# Patient Record
Sex: FEMALE | Race: WHITE
[De-identification: names, ages, dates, MRNs, and addresses within clinical notes are randomized per-mention and may not be internally consistent; named-entity substitution may affect disease eponyms.]

---

## 2019-01-30 ENCOUNTER — HOSPITAL ENCOUNTER (EMERGENCY)
Dept: HOSPITAL 10 - FTE | Age: 24
Discharge: HOME | End: 2019-01-30
Payer: COMMERCIAL

## 2019-01-30 ENCOUNTER — HOSPITAL ENCOUNTER (EMERGENCY)
Dept: HOSPITAL 91 - FTE | Age: 24
Discharge: HOME | End: 2019-01-30
Payer: COMMERCIAL

## 2019-01-30 VITALS — HEART RATE: 60 BPM | DIASTOLIC BLOOD PRESSURE: 58 MMHG | SYSTOLIC BLOOD PRESSURE: 100 MMHG | RESPIRATION RATE: 20 BRPM

## 2019-01-30 VITALS
BODY MASS INDEX: 22.53 KG/M2 | WEIGHT: 152.12 LBS | HEIGHT: 69 IN | HEIGHT: 69 IN | BODY MASS INDEX: 22.53 KG/M2 | WEIGHT: 152.12 LBS

## 2019-01-30 DIAGNOSIS — F17.210: ICD-10-CM

## 2019-01-30 DIAGNOSIS — N12: Primary | ICD-10-CM

## 2019-01-30 LAB
ADD MAN DIFF?: NO
ADD UMIC: YES
ALANINE AMINOTRANSFERASE: 25 IU/L (ref 13–69)
ALBUMIN/GLOBULIN RATIO: 1.25
ALBUMIN: 4.4 G/DL (ref 3.3–4.9)
ALKALINE PHOSPHATASE: 77 IU/L (ref 42–121)
ANION GAP: 11 (ref 5–13)
ASPARTATE AMINO TRANSFERASE: 19 IU/L (ref 15–46)
BASOPHIL #: 0.1 10^3/UL (ref 0–0.1)
BASOPHILS %: 0.7 % (ref 0–2)
BILIRUBIN,DIRECT: 0 MG/DL (ref 0–0.2)
BILIRUBIN,TOTAL: 0.1 MG/DL (ref 0.2–1.3)
BLOOD UREA NITROGEN: 10 MG/DL (ref 7–20)
CALCIUM: 9.1 MG/DL (ref 8.4–10.2)
CARBON DIOXIDE: 28 MMOL/L (ref 21–31)
CHLORIDE: 101 MMOL/L (ref 97–110)
CREATININE: 0.54 MG/DL (ref 0.44–1)
EOSINOPHILS #: 0.4 10^3/UL (ref 0–0.5)
EOSINOPHILS %: 3.3 % (ref 0–7)
GLOBULIN: 3.5 G/DL (ref 1.3–3.2)
GLUCOSE: 96 MG/DL (ref 70–220)
HEMATOCRIT: 39.7 % (ref 37–47)
HEMOGLOBIN: 13.2 G/DL (ref 12–16)
IMMATURE GRANS #M: 0.02 10^3/UL (ref 0–0.03)
IMMATURE GRANS % (M): 0.2 % (ref 0–0.43)
LIPASE: 61 U/L (ref 23–300)
LYMPHOCYTES #: 1.7 10^3/UL (ref 0.8–2.9)
LYMPHOCYTES %: 14.6 % (ref 15–51)
MEAN CORPUSCULAR HEMOGLOBIN: 31.3 PG (ref 29–33)
MEAN CORPUSCULAR HGB CONC: 33.2 G/DL (ref 32–37)
MEAN CORPUSCULAR VOLUME: 94.1 FL (ref 82–101)
MEAN PLATELET VOLUME: 10.6 FL (ref 7.4–10.4)
MONOCYTE #: 1.1 10^3/UL (ref 0.3–0.9)
MONOCYTES %: 9.3 % (ref 0–11)
NEUTROPHIL #: 8.5 10^3/UL (ref 1.6–7.5)
NEUTROPHILS %: 71.9 % (ref 39–77)
NUCLEATED RED BLOOD CELLS #: 0 10^3/UL (ref 0–0)
NUCLEATED RED BLOOD CELLS%: 0 /100WBC (ref 0–0)
PLATELET COUNT: 226 10^3/UL (ref 140–415)
POTASSIUM: 3.7 MMOL/L (ref 3.5–5.1)
RED BLOOD COUNT: 4.22 10^6/UL (ref 4.2–5.4)
RED CELL DISTRIBUTION WIDTH: 13 % (ref 11.5–14.5)
SODIUM: 140 MMOL/L (ref 135–144)
TOTAL PROTEIN: 7.9 G/DL (ref 6.1–8.1)
UR ASCORBIC ACID: NEGATIVE MG/DL
UR BACTERIA: (no result) /HPF
UR BILIRUBIN (DIP): NEGATIVE MG/DL
UR BLOOD (DIP): (no result) MG/DL
UR CLARITY: (no result)
UR COLOR: YELLOW
UR GLUCOSE (DIP): NEGATIVE MG/DL
UR KETONES (DIP): NEGATIVE MG/DL
UR LEUKOCYTE ESTERASE (DIP): (no result) LEU/UL
UR NITRITE (DIP): NEGATIVE MG/DL
UR PH (DIP): 7 (ref 5–9)
UR RBC: 89 /HPF (ref 0–5)
UR SPECIFIC GRAVITY (DIP): 1.01 (ref 1–1.03)
UR SQUAMOUS EPITHELIAL CELL: (no result) /HPF
UR TOTAL PROTEIN (DIP): (no result) MG/DL
UR UROBILINOGEN (DIP): NEGATIVE MG/DL
UR WBC: 26 /HPF (ref 0–5)
WHITE BLOOD COUNT: 11.8 10^3/UL (ref 4.8–10.8)

## 2019-01-30 PROCEDURE — 87086 URINE CULTURE/COLONY COUNT: CPT

## 2019-01-30 PROCEDURE — 83690 ASSAY OF LIPASE: CPT

## 2019-01-30 PROCEDURE — 81001 URINALYSIS AUTO W/SCOPE: CPT

## 2019-01-30 PROCEDURE — 80053 COMPREHEN METABOLIC PANEL: CPT

## 2019-01-30 PROCEDURE — 96375 TX/PRO/DX INJ NEW DRUG ADDON: CPT

## 2019-01-30 PROCEDURE — 36415 COLL VENOUS BLD VENIPUNCTURE: CPT

## 2019-01-30 PROCEDURE — 84703 CHORIONIC GONADOTROPIN ASSAY: CPT

## 2019-01-30 PROCEDURE — 96374 THER/PROPH/DIAG INJ IV PUSH: CPT

## 2019-01-30 PROCEDURE — 99284 EMERGENCY DEPT VISIT MOD MDM: CPT

## 2019-01-30 PROCEDURE — 85025 COMPLETE CBC W/AUTO DIFF WBC: CPT

## 2019-01-30 RX ADMIN — LIDOCAINE HYDROCHLORIDE 1 MLS/HR: 10 INJECTION, SOLUTION EPIDURAL; INFILTRATION; INTRACAUDAL; PERINEURAL at 03:00

## 2019-01-30 RX ADMIN — KETOROLAC TROMETHAMINE 1 MG: 30 INJECTION, SOLUTION INTRAMUSCULAR at 03:34

## 2019-01-30 RX ADMIN — ONDANSETRON HYDROCHLORIDE 1 MG: 2 INJECTION, SOLUTION INTRAMUSCULAR; INTRAVENOUS at 03:05

## 2019-01-30 RX ADMIN — CEFTRIAXONE SODIUM 1 GM: 1 INJECTION, POWDER, FOR SOLUTION INTRAMUSCULAR; INTRAVENOUS at 04:32

## 2019-01-30 RX ADMIN — MORPHINE SULFATE 1 MG: 2 INJECTION, SOLUTION INTRAMUSCULAR; INTRAVENOUS at 02:39

## 2019-01-30 NOTE — ERD
ER Documentation


Chief Complaint


Chief Complaint





LOW BACK PAIN





HPI


This is a 23-year-old female with a nonsignificant past medical history presents


ED with complaints of right low back pain that started this evening.  Patient 


describes the back pain is sharp and states that is been constant.  Denies any 


fall or injury to account for right low back pain.  No similar pain in the past.


 Admits to nausea.  Patient denies any dysuria, hematuria, increased frequency 


of urination, fever, chills, vomiting, ABDOMINAL PAIN, diarrhea, constipation, 


hemoptysis, hematemesis, hematochezia, melena, chest pain, shortness of breath, 


cough, congestion and all other symptoms.  No IVDA





ROS


All systems reviewed and are negative except as per history of present illness.





Allergies


Allergies:  


Coded Allergies:  


     No Known Allergy (Unverified , 19)





PMhx/Soc


Medical and Surgical Hx:  pt denies Medical Hx, pt denies Surgical Hx


Hx Alcohol Use:  Yes (occassionally)


Hx Substance Use:  Yes (marijuana)


Hx Tobacco Use:  No


Smoking Status:  Current every day smoker





FmHx


Family History:  No diabetes





Physical Exam


Vitals





Vital Signs


  Date      Temp  Pulse  Resp  B/P (MAP)   Pulse Ox  O2          O2 Flow    FiO2


Time                                                 Delivery    Rate


   19  99.0     74    18      109/59        97


     01:55                           (76)





Physical Exam


Physical Exam


Vitals signs: Reviewed by me.


General: Well developed, well nourished, in no acute distress. Patient is awake 


and alert.


Head: Normocephalic, atraumatic.


Eyes: Normal conjunctiva, Pupils PERRLA, EOM intact grossly


ENT: Pharynx is clear, Moist mucous membranes, external ears, nose and mouth n


ormal


Neck: Supple, no masses, lymphadenopathy or JVD


Respiratory: Clear to auscultation bilaterally with no wheezing, rhonchi, rales,


no distress


Cardiovascular: RRR, no murmurs, rubs, or gallops


Abdominal: Soft, nondistended, no peritoneal signs, no rigidity, no surgical 


abdomen, bowel sounds present all 4 quadrants, nontender 90 palpation all 4 


quadrants, no rebound tenderness, McBurney's point nontender, Brewster sign 


negative


Back: No midline tenderness.  Right CVA tenderness present, mild tenderness 


palpation along the paravertebral muscles in the right lumbar spine


Neurologic: Alert and oriented, moving all extremities, normal speech, no focal 


weakness, no cerebellar signs. Normal mentation


Skin: warm and dry, No rash


Psych: Normal mood


Result Diagram:  


197                                                                    


           19 025





Results 24 hrs





Laboratory Tests


              Test
                                  19
02:57


              White Blood Count                      11.8 10^3/ul


              Red Blood Count                        4.22 10^6/ul


              Hemoglobin                                13.2 g/dl


              Hematocrit                                   39.7 %


              Mean Corpuscular Volume                     94.1 fl


              Mean Corpuscular Hemoglobin                 31.3 pg


              Mean Corpuscular Hemoglobin
Concent      33.2 g/dl 



              Red Cell Distribution Width                  13.0 %


              Platelet Count                          226 10^3/UL


              Mean Platelet Volume                        10.6 fl


              Immature Granulocytes %                     0.200 %


              Neutrophils %                                71.9 %


              Lymphocytes %                                14.6 %


              Monocytes %                                   9.3 %


              Eosinophils %                                 3.3 %


              Basophils %                                   0.7 %


              Nucleated Red Blood Cells %             0.0 /100WBC


              Immature Granulocytes #               0.020 10^3/ul


              Neutrophils #                           8.5 10^3/ul


              Lymphocytes #                           1.7 10^3/ul


              Monocytes #                             1.1 10^3/ul


              Eosinophils #                           0.4 10^3/ul


              Basophils #                             0.1 10^3/ul


              Nucleated Red Blood Cells #             0.0 10^3/ul


              Urine Color                          YELLOW


              Urine Clarity
                       SLIGHTLY
CLOUDY


              Urine pH                                        7.0


              Urine Specific Gravity                        1.011


              Urine Ketones                        NEGATIVE mg/dL


              Urine Nitrite                        NEGATIVE mg/dL


              Urine Bilirubin                      NEGATIVE mg/dL


              Urine Urobilinogen                   NEGATIVE mg/dL


              Urine Leukocyte Esterase                  1+ George/ul


              Urine Microscopic RBC                       89 /HPF


              Urine Microscopic WBC                       26 /HPF


              Urine Squamous Epithelial
Cells      FEW /HPF 



              Urine Bacteria                       FEW /HPF


              Urine Hemoglobin                           3+ mg/dL


              Urine Glucose                        NEGATIVE mg/dL


              Urine Total Protein                        1+ mg/dl


              Urine Pregnancy Test                 NEGATIVE


              Sodium Level                             140 mmol/L


              Potassium Level                          3.7 mmol/L


              Chloride Level                           101 mmol/L


              Carbon Dioxide Level                      28 mmol/L


              Anion Gap                                        11


              Blood Urea Nitrogen                        10 mg/dl


              Creatinine                               0.54 mg/dl


              Est Glomerular Filtrat Rate
mL/min   > 60 mL/min 



              Glucose Level                              96 mg/dl


              Calcium Level                             9.1 mg/dl


              Total Bilirubin                           0.1 mg/dl


              Direct Bilirubin                         0.00 mg/dl


              Indirect Bilirubin                        0.1 mg/dl


              Aspartate Amino Transf
(AST/SGOT)          19 IU/L 



              Alanine Aminotransferase
(ALT/SGPT)        25 IU/L 



              Alkaline Phosphatase                        77 IU/L


              Total Protein                              7.9 g/dl


              Albumin                                    4.4 g/dl


              Globulin                                  3.50 g/dl


              Albumin/Globulin Ratio                         1.25


              Lipase                                       61 U/L





Current Medications


 Medications
   Dose
          Sig/Gi
       Start Time
   Status  Last


 (Trade)       Ordered        Route
 PRN     Stop Time              Admin
Dose


                              Reason                                Admin


 Sodium         500 ml @ 
     Q1H STAT
      19       DC           19


Chloride       500 mls/hr     IV
            02:39
                       03:00



                                             19 03:38


 Morphine       2 mg           ONCE  STAT
    19       DC       



Sulfate
                      IV
            02:39



(morphine)                                   19 02:41


 Ondansetron    4 mg           ONCE  STAT
    19       DC           19


HCl
  (Zofran                 IV
            02:39
                       03:05



Inj)                                         19 02:41


 Ketorolac
     30 mg          ONCE  STAT
    19       DC           19


Tromethamine
                 IV
            02:39
                       03:34



 (Toradol)                                   19 02:41








Procedures/MDM





LAB INTERPRETATION:


CBC remarkable for elevated WBC 11.8, no evidence of hemorrhage


Chemistry shows no evidence of significant electrolyte abnormalities or renal in


sufficiency


Liver function test shows no evidence of acute biliary or hepatic dysfunction


Lipase shows no evidence of acute pancreatitis


UA remarkable for 26 WBC, 89 RBC, 1+ leukocyte esterase


Urine pregnancy negative


ER COURSE:


The patient was given morphine, IV normal saline, Toradol and Zofran


The medication was well tolerated and the patient reports improvement in 


symptoms.  


The patient was stable throughout ED course.


I kept the patient and/or family informed of laboratory and diagnostic imaging 


results throughout the emergency room course.





The patient was promptly evaluated and a treatment plan was devised based on H&P


and other data. This plan was discussed with the patient who agreed and had no 


further questions or concerns prior to discharge.





MEDICAL DECISION MAKIN-year-old female presents ED with sharp right-sided lower back pain times 2 


hours prior to arrival in ED.  Patient does have some right CVA tenderness which


prompted me to think pyelonephritis although she does not have any urinary 


symptoms.  Patient urine is remarkable for RBC, WBC and leukocyte esterase.  


Given these findings along with history and physical exam patient likely has 


pyelonephritis.  Patient was given 1 g of ceftriaxone in the emergency 


department and pain was well controlled.  At this time there is no genitourinary


emergency.  No evidence of sepsis, obstructive pyelonephritis, septic stone, 


ovarian torsion, tubo-ovarian abscess, ectopic pregnancy, among others.  Vitals 


are stable patient can be managed close outpatient follow-up.  Advised patient 


follow-up with her primary care in the next 48 hours.  Return to ED with any 


worsening symptoms


 





DISPOSITION PLAN:


We discussed follow up with the patient's primary care doctor within 24 to 48 


hours.  Patient counseled regarding my diagnostic impression and care plan. 


Prior to discharge all questions answered. Pt agrees with treatment plan and 


understands strict return precautions. Precautionary instructions provided 


including instructions to return to the ER if not improving or for any worsening


or changing symptoms or concerns.





SPECIALIST FOLLOW UP RECOMMENDED: None


Patient has been advised to follow up with primary care in 1-2 days.





Disclaimer: Inadvertent spelling and grammatical errors are likely due to 


EHR/dictation software use and do not reflect on the overall quality of patient 


care. Also, please note that the electronic time recorded on this note does not 


necessarily reflect the actual time of the patient encounter.





Departure


Diagnosis:  


   Primary Impression:  


   Pyelonephritis


Condition:  Stable


Patient Instructions:  Pyelonephritis, Female (Adult)


Referrals:  


[facility]





Additional Instructions:  


Patient advised to return to the ED immediately for new or worsening symptoms. 


Patient advised to follow up with primary care provider in the next 24-48 hours.


Patient verbalized understanding and agrees with treatment plan and course of 


action.





If patient has no primary care they may follow up with one of the community 


clinics listed on the following page or one of the options listed below








[facility] [address] [address]





or





[facility]


[address] [place] [address]





or





[address] [address]











ERIN PEÑALOZA PA-C          2019 03:52